# Patient Record
(demographics unavailable — no encounter records)

---

## 2025-07-16 NOTE — HISTORY OF PRESENT ILLNESS
[Y] : Patient is sexually active [N] : Patient denies prior pregnancies [Menarche Age: ____] : age at menarche was [unfilled] [No] : Patient does not have concerns regarding sex [Currently Active] : currently active [Men] : men [PapSmeardate] : 6/28/2025 [TextBox_31] : NEG [HIVDate] : 7/2/2024 [TextBox_53] : NEG [SyphilisDate] : 7/2/2024 [TextBox_58] : NEG [GonorrheaDate] : 7/2/2024 [TextBox_63] : NEG [ChlamydiaDate] : 7/2/2024 [TextBox_68] : NEG [HepatitisBDate] : 7/2/2024 [TextBox_83] : NEG [HepatitisCDate] : 7/2/2024 [TextBox_88] : NEG [LMPDate] : 6/28/2025 [PGHxTotal] : 0 [FreeTextEntry1] : 6/28/2025

## 2025-07-16 NOTE — PLAN
[FreeTextEntry1] : -F/u pap, STI screening -Routine physical activity encouraged -Consistent condom use encouraged -RTO one year or sooner PRN for any new problems, questions or concerns

## 2025-07-16 NOTE — PHYSICAL EXAM
[MA] : MA [Appropriately responsive] : appropriately responsive [Alert] : alert [No Acute Distress] : no acute distress [Soft] : soft [Non-tender] : non-tender [Non-distended] : non-distended [No Mass] : no mass [Oriented x3] : oriented x3 [Examination Of The Breasts] : a normal appearance [No Masses] : no breast masses were palpable [Labia Majora] : normal [Labia Minora] : normal [Normal] : normal [Uterine Adnexae] : normal [FreeTextEntry2] : Seble